# Patient Record
Sex: MALE | Race: WHITE | NOT HISPANIC OR LATINO | Employment: OTHER | URBAN - METROPOLITAN AREA
[De-identification: names, ages, dates, MRNs, and addresses within clinical notes are randomized per-mention and may not be internally consistent; named-entity substitution may affect disease eponyms.]

---

## 2017-01-03 ENCOUNTER — OFFICE VISIT (OUTPATIENT)
Dept: UROLOGY | Facility: CLINIC | Age: 77
End: 2017-01-03
Attending: UROLOGY
Payer: MEDICARE

## 2017-01-03 VITALS
BODY MASS INDEX: 35.93 KG/M2 | HEIGHT: 75 IN | HEART RATE: 70 BPM | SYSTOLIC BLOOD PRESSURE: 135 MMHG | WEIGHT: 289 LBS | DIASTOLIC BLOOD PRESSURE: 89 MMHG

## 2017-01-03 DIAGNOSIS — N40.1 BENIGN NON-NODULAR PROSTATIC HYPERPLASIA WITH LOWER URINARY TRACT SYMPTOMS: Primary | ICD-10-CM

## 2017-01-03 PROCEDURE — 99213 OFFICE O/P EST LOW 20 MIN: CPT | Mod: PBBFAC | Performed by: UROLOGY

## 2017-01-03 PROCEDURE — 99213 OFFICE O/P EST LOW 20 MIN: CPT | Mod: S$PBB,,, | Performed by: UROLOGY

## 2017-01-03 PROCEDURE — 99999 PR PBB SHADOW E&M-EST. PATIENT-LVL III: CPT | Mod: PBBFAC,,, | Performed by: UROLOGY

## 2017-01-03 RX ORDER — TAMSULOSIN HYDROCHLORIDE 0.4 MG/1
1 CAPSULE ORAL DAILY
Qty: 90 CAPSULE | Refills: 3 | Status: SHIPPED | OUTPATIENT
Start: 2017-01-03 | End: 2017-12-29 | Stop reason: SDUPTHER

## 2017-01-03 RX ORDER — SERTRALINE HYDROCHLORIDE 25 MG/1
TABLET, FILM COATED ORAL
COMMUNITY
Start: 2016-12-23 | End: 2019-01-03

## 2017-01-03 NOTE — MR AVS SNAPSHOT
[facility]  [address] [place] [address], [address]  [facility] [address]  Phone: [phone]                  Antonino Bartlett   1/3/2017 9:30 AM   Office Visit    Description:  Male : 1940   Provider:  Brett Allen MD   Department:  [facility]           Reason for Visit     Other           Diagnoses this Visit        Comments    Benign non-nodular prostatic hyperplasia with lower urinary tract symptoms    -  Primary            To Do List           Goals (5 Years of Data)     None      Follow-Up and Disposition     Return in about 1 year (around 1/3/2018).       These Medications        Disp Refills Start End    tamsulosin (FLOMAX) 0.4 mg Cp24 90 capsule 3 1/3/2017     Take 1 capsule (0.4 mg total) by mouth Daily. - Oral    Pharmacy: [facility] [address] #: [phone]         [facility]     [facility] On Call Nurse Care Line -  Assistance  Registered nurses in the [facility] Call Center provide clinical advisement, health education, appointment booking, and other advisory services.  Call for this free service at [phone].             Medications           Message regarding Medications     Verify the changes and/or additions to your medication regime listed below are the same as discussed with your clinician today.  If any of these changes or additions are incorrect, please notify your healthcare provider.        STOP taking these medications     erythromycin (ROMYCIN) ophthalmic ointment     hydrocodone-acetaminophen 5-300 mg Tab     methylPREDNISolone (MEDROL DOSEPACK) 4 mg tablet     nifedipine (ADALAT CC) 30 MG TbSR Take 1 tablet by mouth.           Verify that the below list of medications is an accurate representation of the medications you are currently taking.  If none reported, the list may be blank. If incorrect, please contact your healthcare provider. Carry this list with you in case of emergency.          "  Current Medications     amlodipine (NORVASC) 5 MG tablet     butalbital-acetaminophen-caffeine -40 mg (FIORICET) -40 mg per tablet Take 1 tablet by mouth.    CALCIUM CARBONATE/VITAMIN D3 (CALTRATE 600 + D ORAL) Take by mouth.    fish oil-omega-3 fatty acids 300-1,000 mg capsule Take 2 g by mouth once daily.    fluticasone (FLONASE) 50 mcg/actuation nasal spray 2 sprays by Each Nare route Daily.    hydrocodone-acetaminophen 5-325mg (NORCO) 5-325 mg per tablet 1 tablet as needed.    indomethacin (INDOCIN) 50 MG capsule Take by mouth.    isosorbide mononitrate (IMDUR) 30 MG 24 hr tablet     ketoconazole (NIZORAL) 2 % cream     lisinopril (PRINIVIL,ZESTRIL) 20 MG tablet Take 20 mg by mouth once daily.    loratadine (CLARITIN) 10 mg tablet Take 10 mg by mouth once daily.    niacin 500 MG CpSR Take 2 capsules by mouth Daily.    NIASPAN EXTENDED-RELEASE 1,000 mg CR tablet     omega-3 fatty acids-vitamin E (FISH OIL) 1,000 mg Cap Take by mouth.    pindolol (VISKEN) 5 MG Tab     pravastatin (PRAVACHOL) 20 MG tablet Take 1 tablet by mouth Daily.    ranitidine (ZANTAC) 150 MG tablet Take by mouth as needed.    sertraline (ZOLOFT) 25 MG tablet     spironolactone (ALDACTONE) 25 MG tablet Take 25 mg by mouth Daily.    tamsulosin (FLOMAX) 0.4 mg Cp24 Take 1 capsule (0.4 mg total) by mouth Daily.    tramadol (ULTRAM) 50 mg tablet 1 tablet as needed.    valacyclovir (VALTREX) 500 MG tablet Take 500 mg by mouth 2 (two) times daily.    VOLTAREN 1 % Gel     warfarin (COUMADIN) 4 MG tablet Take 1 tablet by mouth Daily.    MULTIVIT,THER IRON,CA,FA & MIN (MULTIVITAMIN) Tab Take 1 tablet by mouth.           Clinical Reference Information           Vital Signs - Last Recorded  Most recent update: 1/3/2017  9:19 AM by Marsha Silva MA    BP Pulse Ht Wt BMI    135/89 (BP Location: Left arm, Patient Position: Sitting, BP Method: Automatic) 70 6' 3" (1.905 m) 131.1 kg (289 lb) 36.12 kg/m2      Blood Pressure          Most Recent " Value    BP  135/89      Allergies as of 1/3/2017     No Known Allergies      Immunizations Administered on Date of Encounter - 1/3/2017     None

## 2017-01-03 NOTE — PROGRESS NOTES
"Subjective:      Antonino Bartlett is a 76 y.o. male who returns today regarding his     BPH.  Symptoms are well controlled with Flomax.  No complaints.  The patient has chosen to stop checking PSA.    The following portions of the patient's history were reviewed and updated as appropriate: allergies, current medications, past family history, past medical history, past social history, past surgical history and problem list.    Review of Systems  Pertinent items are noted in HPI.  A comprehensive multipoint review of systems was negative except as otherwise stated in the HPI.     Objective:   Vitals:   Visit Vitals    /89 (BP Location: Left arm, Patient Position: Sitting, BP Method: Automatic)    Pulse 70    Ht 6' 3" (1.905 m)    Wt 131.1 kg (289 lb)    BMI 36.12 kg/m2       Physical Exam   General: alert and oriented, no acute distress  Respiratory: Symmetric expansion, non-labored breathing  Cardiovascular: no peripheral edema  Abdomen: non distended  Skin: normal coloration and turgor, no rashes, no suspicious skin lesions noted  Neuro: no gross deficits  Psych: normal judgment and insight, normal mood/affect and non-anxious  Prostate greater than 60 g.  Unable to reach base.  No nodules on the palpable portion of the gland  Physical Exam    Lab Review   Urinalysis demonstrates no specimen    Lab Results   Component Value Date    WBC 5.28 07/27/2004    HGB 13.5 (L) 07/27/2004    HCT 39.5 (L) 07/27/2004    MCV 88.2 07/27/2004     (L) 07/27/2004     Lab Results   Component Value Date    CREATININE 1.2 07/27/2004    BUN 25 (H) 07/27/2004       Imaging  -  Assessment and Plan:   Benign non-nodular prostatic hyperplasia with lower urinary tract symptoms    Other orders  -     tamsulosin (FLOMAX) 0.4 mg Cp24; Take 1 capsule (0.4 mg total) by mouth Daily.  Dispense: 90 capsule; Refill: 3      No more PSA.  Return to clinic 1 year for prostate exam or sooner if any problems  "

## 2017-12-29 RX ORDER — TAMSULOSIN HYDROCHLORIDE 0.4 MG/1
CAPSULE ORAL
Qty: 90 CAPSULE | Refills: 3 | Status: SHIPPED | OUTPATIENT
Start: 2017-12-29 | End: 2018-01-09 | Stop reason: SDUPTHER

## 2018-01-09 ENCOUNTER — OFFICE VISIT (OUTPATIENT)
Dept: UROLOGY | Facility: CLINIC | Age: 78
End: 2018-01-09
Attending: UROLOGY
Payer: MEDICARE

## 2018-01-09 VITALS
HEART RATE: 75 BPM | HEIGHT: 75 IN | BODY MASS INDEX: 35.93 KG/M2 | DIASTOLIC BLOOD PRESSURE: 70 MMHG | WEIGHT: 289 LBS | SYSTOLIC BLOOD PRESSURE: 128 MMHG

## 2018-01-09 DIAGNOSIS — N40.1 BENIGN NON-NODULAR PROSTATIC HYPERPLASIA WITH LOWER URINARY TRACT SYMPTOMS: Primary | ICD-10-CM

## 2018-01-09 PROCEDURE — 99213 OFFICE O/P EST LOW 20 MIN: CPT | Mod: S$GLB,,, | Performed by: UROLOGY

## 2018-01-09 RX ORDER — TAMSULOSIN HYDROCHLORIDE 0.4 MG/1
1 CAPSULE ORAL DAILY
Qty: 90 CAPSULE | Refills: 3 | Status: SHIPPED | OUTPATIENT
Start: 2018-01-09 | End: 2019-01-14 | Stop reason: SDUPTHER

## 2018-01-09 NOTE — PROGRESS NOTES
"Subjective:      Antonino Bartlett is a 77 y.o. male who returns today regarding his     No complaints  flomax controls LUTS well.    The following portions of the patient's history were reviewed and updated as appropriate: allergies, current medications, past family history, past medical history, past social history, past surgical history and problem list.    Review of Systems  Pertinent items are noted in HPI.  A comprehensive multipoint review of systems was negative except as otherwise stated in the HPI.     Objective:   Vitals: /70 (BP Location: Right arm, Patient Position: Sitting, BP Method: Large (Automatic))   Pulse 75   Ht 6' 3" (1.905 m)   Wt 131.1 kg (289 lb)   BMI 36.12 kg/m²     Physical Exam   General: alert and oriented, no acute distress  Respiratory: Symmetric expansion, non-labored breathing  Cardiovascular: no peripheral edema  Abdomen:  non distended  Skin: normal coloration and turgor, no rashes, no suspicious skin lesions noted  Neuro: no gross deficits  Psych: normal judgment and insight, normal mood/affect and non-anxious  Prostate unable to reach base  No nodules palpable    Physical Exam    Lab Review   Urinalysis demonstrates no specimen      Lab Results   Component Value Date    WBC 5.28 07/27/2004    HGB 13.5 (L) 07/27/2004    HCT 39.5 (L) 07/27/2004    MCV 88.2 07/27/2004     (L) 07/27/2004     Lab Results   Component Value Date    CREATININE 1.2 07/27/2004    BUN 25 (H) 07/27/2004       Imaging  -  Assessment and Plan:   Benign non-nodular prostatic hyperplasia with lower urinary tract symptoms      Pt prefers to stop checking psa  Cont flomax  rtc 1 yr for EARNESTINE    "

## 2018-01-09 NOTE — PATIENT INSTRUCTIONS
Tamsulosin capsules  What is this medicine?  TAMSULOSIN (brownlee ESTEVAN alberto sin) is used to treat enlargement of the prostate gland in men, a condition called benign prostatic hyperplasia or BPH. It is not for use in women. It works by relaxing muscles in the prostate and bladder neck. This improves urine flow and reduces BPH symptoms.  How should I use this medicine?  Take this medicine by mouth about 30 minutes after the same meal every day. Follow the directions on the prescription label. Swallow the capsules whole with a glass of water. Do not crush, chew, or open capsules. Do not take your medicine more often than directed. Do not stop taking your medicine unless your doctor tells you to.  Talk to your pediatrician regarding the use of this medicine in children. Special care may be needed.  What side effects may I notice from receiving this medicine?  Side effects that you should report to your doctor or health care professional as soon as possible:  · allergic reactions like skin rash or itching, hives, swelling of the lips, mouth, tongue, or throat  · breathing problems  · change in vision  · feeling faint or lightheaded  · irregular heartbeat  · prolonged or painful erection  · weakness  Side effects that usually do not require medical attention (report to your doctor or health care professional if they continue or are bothersome):  · back pain  · change in sex drive or performance  · constipation, nausea or vomiting  · cough  · drowsy  · runny or stuffy nose  · trouble sleeping  What may interact with this medicine?  · cimetidine  · fluoxetine  · ketoconazole  · medicines for erectile disfunction like sildenafil, tadalafil, vardenafil  · medicines for high blood pressure  · other alpha-blockers like alfuzosin, doxazosin, phentolamine, phenoxybenzamine, prazosin, terazosin  · warfarin  What if I miss a dose?  If you miss a dose, take it as soon as you can. If it is almost time for your next dose, take only that  dose. Do not take double or extra doses. If you stop taking your medicine for several days or more, ask your doctor or health care professional what dose you should start back on.  Where should I keep my medicine?  Keep out of the reach of children.  Store at room temperature between 15 and 30 degrees C (59 and 86 degrees F). Throw away any unused medicine after the expiration date.  What should I tell my health care provider before I take this medicine?  They need to know if you have any of the following conditions:  · advanced kidney disease  · advanced liver disease  · low blood pressure  · prostate cancer  · an unusual or allergic reaction to tamsulosin, sulfa drugs, other medicines, foods, dyes, or preservatives  · pregnant or trying to get pregnant  · breast-feeding  What should I watch for while using this medicine?  Visit your doctor or health care professional for regular check ups. You will need lab work done before you start this medicine and regularly while you are taking it. Check your blood pressure as directed. Ask your health care professional what your blood pressure should be, and when you should contact him or her.  This medicine may make you feel dizzy or lightheaded. This is more likely to happen after the first dose, after an increase in dose, or during hot weather or exercise. Drinking alcohol and taking some medicines can make this worse. Do not drive, use machinery, or do anything that needs mental alertness until you know how this medicine affects you. Do not sit or stand up quickly. If you begin to feel dizzy, sit down until you feel better. These effects can decrease once your body adjusts to the medicine.  Contact your doctor or health care professional right away if you have an erection that lasts longer than 4 hours or if it becomes painful. This may be a sign of a serious problem and must be treated right away to prevent permanent damage.  If you are thinking of having cataract  surgery, tell your eye surgeon that you have taken this medicine.  NOTE:This sheet is a summary. It may not cover all possible information. If you have questions about this medicine, talk to your doctor, pharmacist, or health care provider. Copyright© 2017 [employer]

## 2019-01-03 ENCOUNTER — OFFICE VISIT (OUTPATIENT)
Dept: UROLOGY | Facility: CLINIC | Age: 79
End: 2019-01-03
Payer: MEDICARE

## 2019-01-03 VITALS
BODY MASS INDEX: 35.93 KG/M2 | HEIGHT: 75 IN | HEART RATE: 69 BPM | SYSTOLIC BLOOD PRESSURE: 109 MMHG | DIASTOLIC BLOOD PRESSURE: 63 MMHG | WEIGHT: 289 LBS

## 2019-01-03 DIAGNOSIS — N40.1 BENIGN NON-NODULAR PROSTATIC HYPERPLASIA WITH LOWER URINARY TRACT SYMPTOMS: Primary | ICD-10-CM

## 2019-01-03 DIAGNOSIS — N31.9 NEUROGENIC BLADDER: ICD-10-CM

## 2019-01-03 LAB
BILIRUB SERPL-MCNC: NORMAL MG/DL
BLOOD URINE, POC: NORMAL
COLOR, POC UA: NORMAL
GLUCOSE UR QL STRIP: NORMAL
KETONES UR QL STRIP: NORMAL
LEUKOCYTE ESTERASE URINE, POC: NORMAL
NITRITE, POC UA: NORMAL
PH, POC UA: NORMAL
PROTEIN, POC: NORMAL
SPECIFIC GRAVITY, POC UA: NORMAL
UROBILINOGEN, POC UA: NORMAL

## 2019-01-03 PROCEDURE — 99214 PR OFFICE/OUTPT VISIT, EST, LEVL IV, 30-39 MIN: ICD-10-PCS | Mod: 25,S$GLB,, | Performed by: UROLOGY

## 2019-01-03 PROCEDURE — 99214 OFFICE O/P EST MOD 30 MIN: CPT | Mod: 25,S$GLB,, | Performed by: UROLOGY

## 2019-01-03 PROCEDURE — 81002 URINALYSIS NONAUTO W/O SCOPE: CPT | Mod: S$GLB,,, | Performed by: UROLOGY

## 2019-01-03 PROCEDURE — 81002 POCT URINE DIPSTICK WITHOUT MICROSCOPE: ICD-10-PCS | Mod: S$GLB,,, | Performed by: UROLOGY

## 2019-01-03 RX ORDER — SERTRALINE HYDROCHLORIDE 100 MG/1
TABLET, FILM COATED ORAL
COMMUNITY
Start: 2019-01-02

## 2019-01-03 RX ORDER — PROPRANOLOL HYDROCHLORIDE 60 MG/1
CAPSULE, EXTENDED RELEASE ORAL
COMMUNITY
Start: 2019-01-02

## 2019-01-03 RX ORDER — WARFARIN 6 MG/1
TABLET ORAL
COMMUNITY
Start: 2018-10-10

## 2019-01-03 NOTE — PROGRESS NOTES
"Subjective:      Antonino Bartlett is a 78 y.o. male who returns today regarding his     Enlarged prostate on Flomax.  He has chosen to stop checking PSA given his age and comorbidities.    He is recovering from a stroke which he had 2 months ago.  He has some slurred speech and some residual weakness but he was able to walk from his car to the office today    He has been having urgency incontinence since his stroke  No dysuria  His stream is strong    The following portions of the patient's history were reviewed and updated as appropriate: allergies, current medications, past family history, past medical history, past social history, past surgical history and problem list.    Review of Systems  Pertinent items are noted in HPI.  A comprehensive multipoint review of systems was negative except as otherwise stated in the HPI.     Objective:   Vitals: /63 (BP Location: Left arm, Patient Position: Sitting, BP Method: X-Large (Automatic))   Pulse 69   Ht 6' 3" (1.905 m)   Wt 131.1 kg (289 lb 0.4 oz)   BMI 36.13 kg/m²     Physical Exam   General: alert and oriented, no acute distress  Respiratory: Symmetric expansion, non-labored breathing  Cardiovascular: normal to inspection  Abdomen: non distended   Skin: normal coloration and turgor, no rashes, no suspicious skin lesions noted  Neuro: no gross deficits  Psych: normal judgment and insight, normal mood/affect and non-anxious    Physical Exam    Lab Review   Urinalysis demonstrates unable to give specimen  Lab Results   Component Value Date    WBC 5.28 07/27/2004    HGB 13.5 (L) 07/27/2004    HCT 39.5 (L) 07/27/2004    MCV 88.2 07/27/2004     (L) 07/27/2004     Lab Results   Component Value Date    CREATININE 1.2 07/27/2004    BUN 25 (H) 07/27/2004       Imaging  Postvoid residual 30 cc  Assessment and Plan:   Benign non-nodular prostatic hyperplasia with lower urinary tract symptoms  -     POCT URINE DIPSTICK WITHOUT MICROSCOPE    Neurogenic bladder  -     " POCT URINE DIPSTICK WITHOUT MICROSCOPE    Other orders  -     mirabegron (MYRBETRIQ) 25 mg Tb24 ER tablet; Take 1 tablet (25 mg total) by mouth once daily.  Dispense: 30 tablet; Refill: 11      I do not wish to use anticholinergic due to the possible cognitive side effects in this patient recently had a stroke    Return to clinic 2 weeks to see nurse practitioner to recheck postvoid residual and check urinalysis

## 2019-01-03 NOTE — PATIENT INSTRUCTIONS
Mirabegron extended-release tablets  What is this medicine?  MIRABEGRON (LUI a BEG william) is used to treat overactive bladder. This medicine reduces the amount of bathroom visits. It may also help to control wetting accidents.  How should I use this medicine?  Take this medicine by mouth with a glass of water. Follow the directions on the prescription label. Do not cut, crush or chew this medicine. You can take it with or without food. If it upsets your stomach, take it with food. Take your medicine at regular intervals. Do not take it more often than directed. Do not stop taking except on your doctor's advice.  Talk to your pediatrician regarding the use of this medicine in children. Special care may be needed.  What side effects may I notice from receiving this medicine?  Side effects that you should report to your doctor or health care professional as soon as possible:  · allergic reactions like skin rash, itching or hives, swelling of the face, lips, or tongue  · chest pain or palpitations  · severe or sudden headache  · high blood pressure  · fast, irregular heartbeat  · redness, blistering, peeling or loosening of the skin, including inside the mouth  · signs of infection like fever or chills; cough; sore throat; pain or difficulty passing urine  · trouble passing urine or change in the amount of urine  Side effects that usually do not require medical attention (Report these to your doctor or health care professional if they continue or are bothersome.):  · constipation  · diarrhea  · dizziness  · dry eyes  · joint pain  · mild headache  · nausea  · runny nose  What may interact with this medicine?  · certain medicines for bladder problems like fesoterodine, oxybutynin, solifenacin, tolterodine  · desipramine  · digoxin  · flecainide  · ketoconazole  · MAOIs like Carbex, Eldepryl, Marplan, Nardil, and Parnate  · metoprolol  · propafenone  · thioridazine  · warfarin  What if I miss a dose?  If you miss a dose,  take it as soon as you can. If it is almost time for your next dose, take only that dose. Do not take double or extra doses.  Where should I keep my medicine?  Keep out of the reach of children.  Store at room temperature between 15 and 30 degrees C (59 and 86 degrees F). Throw away any unused medicine after the expiration date.  What should I tell my health care provider before I take this medicine?  They need to know if you have any of these conditions:  · difficulty passing urine  · high blood pressure  · kidney disease  · liver disease  · an unusual or allergic reaction to mirabegron, other medicines, foods, dyes, or preservatives  · pregnant or trying to get pregnant  · breast-feeding  What should I watch for while using this medicine?  It may take 8 weeks to notice the full benefit from this medicine.  You may need to limit your intake tea, coffee, caffeinated sodas, and alcohol. These drinks may make your symptoms worse.  Visit your doctor or health care professional for regular checks on your progress. Check your blood pressure as directed. Ask your doctor or health care professional what your blood pressure should be and when you should contact him or her.  NOTE:This sheet is a summary. It may not cover all possible information. If you have questions about this medicine, talk to your doctor, pharmacist, or health care provider. Copyright© 2017 [employer]

## 2019-01-14 RX ORDER — TAMSULOSIN HYDROCHLORIDE 0.4 MG/1
CAPSULE ORAL
Qty: 90 CAPSULE | Refills: 3 | Status: SHIPPED | OUTPATIENT
Start: 2019-01-14 | End: 2019-12-20

## 2019-01-15 NOTE — PROGRESS NOTES
Subjective:      Antonino Bartlett is a 78 y.o. male who returns today regarding his urinary symptoms. He is an established patient of Dr. Allen' and is new to me today.    The patient is currently taking Flomax for his BPH. He was seen by Dr. Allen in the clinic earlier this month and reported new onset urge incontinence following a recent stroke. Now on myrbetriq. Today he reports minimal improvement in his urinary symptoms. Wearing 2 depends/day. Reports mobility issues since the stroke.     Denies dysuria, hematuria, flank pain and fever/chills. He presents today to recheck urine sample and for PVR.     + family history of prostate cancer- father and brother (both diagnosed in their 70s).     The following portions of the patient's history were reviewed and updated as appropriate: allergies, current medications, past family history, past medical history, past social history, past surgical history and problem list.    Review of Systems  Constitutional: no fever or chills  ENT: no nasal congestion or sore throat  Respiratory: no cough or shortness of breath  Cardiovascular: no chest pain or palpitations  Gastrointestinal: no nausea or vomiting, tolerating diet  Genitourinary: as per HPI  Hematologic/Lymphatic: no easy bruising or lymphadenopathy  Musculoskeletal: no arthralgias or myalgias  Neurological: no seizures or tremors  Behavioral/Psych: no auditory or visual hallucinations     Objective:   Vitals:   Vitals:    01/17/19 0841   BP: (!) 148/71   Pulse: 67       Physical Exam   General: alert and oriented, no acute distress  Head: normocephalic, atraumatic  Neck: supple, no lymphadenopathy, normal ROM, no masses  Respiratory: Symmetric expansion, non-labored breathing  Cardiovascular: regular rate and rhythm, nomal pulses, no peripheral edema  Abdomen: soft, non tender, non distended, no palpable masses, no hernias, no hepatomegaly or splenomegaly  Genitourinary:   Prostate: enlarged: bilateral; seminal  vesicles not palpated; no nodules noted   Rectum: normal rectal tone, no rectal mass, normal perineum  Lymphatic: no inguinal nodes  Skin: normal coloration and turgor, no rashes, no suspicious skin lesions noted  Neuro: alert and oriented x3, no gross deficits  Psych: normal judgment and insight, normal mood/affect and non-anxious    Lab Review   Urinalysis demonstrates negative for all components, positive for leukocytes (trace), red blood cells (5-10 pat/mL), protein (trace)  PVR: 12 mL  Lab Results   Component Value Date    WBC 5.28 07/27/2004    HGB 13.5 (L) 07/27/2004    HCT 39.5 (L) 07/27/2004    MCV 88.2 07/27/2004     (L) 07/27/2004     Lab Results   Component Value Date    CREATININE 1.2 07/27/2004    BUN 25 (H) 07/27/2004     Lab Results   Component Value Date    PSA 1.9 04/03/2008     Imaging   None    Assessment:   Urge incontinence  Functional incontinence  BPH with LUTS     Plan:   Antonino was seen today for establish care.    Diagnoses and all orders for this visit:    Urge incontinence  -     POCT URINE DIPSTICK WITHOUT MICROSCOPE  -     POCT Bladder Scan  -     Urine culture    Functional incontinence    Benign prostatic hyperplasia without lower urinary tract symptoms    Plan:  --Continue flomax   --Urine culture today, will notify if antibiotics are needed   --Increase dose of myrbetriq to 50 mg   --Explained that this may be functional incontinence 2/2 stroke  --Will determine follow up based on culture results

## 2019-01-17 ENCOUNTER — OFFICE VISIT (OUTPATIENT)
Dept: UROLOGY | Facility: CLINIC | Age: 79
End: 2019-01-17
Payer: MEDICARE

## 2019-01-17 VITALS
DIASTOLIC BLOOD PRESSURE: 71 MMHG | BODY MASS INDEX: 35.93 KG/M2 | WEIGHT: 289 LBS | HEART RATE: 67 BPM | HEIGHT: 75 IN | SYSTOLIC BLOOD PRESSURE: 148 MMHG

## 2019-01-17 DIAGNOSIS — N40.0 BENIGN PROSTATIC HYPERPLASIA WITHOUT LOWER URINARY TRACT SYMPTOMS: ICD-10-CM

## 2019-01-17 DIAGNOSIS — R39.81 FUNCTIONAL INCONTINENCE: ICD-10-CM

## 2019-01-17 DIAGNOSIS — N39.41 URGE INCONTINENCE: Primary | ICD-10-CM

## 2019-01-17 LAB
BILIRUB SERPL-MCNC: ABNORMAL MG/DL
BLOOD URINE, POC: ABNORMAL
COLOR, POC UA: YELLOW
GLUCOSE UR QL STRIP: ABNORMAL
KETONES UR QL STRIP: ABNORMAL
LEUKOCYTE ESTERASE URINE, POC: ABNORMAL
NITRITE, POC UA: ABNORMAL
PH, POC UA: 6
POC RESIDUAL URINE VOLUME: 12 ML (ref 0–100)
PROTEIN, POC: ABNORMAL
SPECIFIC GRAVITY, POC UA: 1
UROBILINOGEN, POC UA: ABNORMAL

## 2019-01-17 PROCEDURE — 51798 POCT BLADDER SCAN: ICD-10-PCS | Mod: S$GLB,,, | Performed by: NURSE PRACTITIONER

## 2019-01-17 PROCEDURE — 51798 US URINE CAPACITY MEASURE: CPT | Mod: S$GLB,,, | Performed by: NURSE PRACTITIONER

## 2019-01-17 PROCEDURE — 99213 OFFICE O/P EST LOW 20 MIN: CPT | Mod: 25,S$GLB,, | Performed by: NURSE PRACTITIONER

## 2019-01-17 PROCEDURE — 87086 URINE CULTURE/COLONY COUNT: CPT

## 2019-01-17 PROCEDURE — 81002 POCT URINE DIPSTICK WITHOUT MICROSCOPE: ICD-10-PCS | Mod: S$GLB,,, | Performed by: NURSE PRACTITIONER

## 2019-01-17 PROCEDURE — 99213 PR OFFICE/OUTPT VISIT, EST, LEVL III, 20-29 MIN: ICD-10-PCS | Mod: 25,S$GLB,, | Performed by: NURSE PRACTITIONER

## 2019-01-17 PROCEDURE — 81002 URINALYSIS NONAUTO W/O SCOPE: CPT | Mod: S$GLB,,, | Performed by: NURSE PRACTITIONER

## 2019-01-19 LAB — BACTERIA UR CULT: NORMAL

## 2019-01-21 DIAGNOSIS — N39.41 URGE INCONTINENCE: Primary | ICD-10-CM

## 2019-12-20 RX ORDER — TAMSULOSIN HYDROCHLORIDE 0.4 MG/1
CAPSULE ORAL
Qty: 90 CAPSULE | Refills: 3 | Status: SHIPPED | OUTPATIENT
Start: 2019-12-20